# Patient Record
Sex: MALE | Race: WHITE | Employment: UNEMPLOYED | URBAN - METROPOLITAN AREA
[De-identification: names, ages, dates, MRNs, and addresses within clinical notes are randomized per-mention and may not be internally consistent; named-entity substitution may affect disease eponyms.]

---

## 2019-05-30 ENCOUNTER — APPOINTMENT (OUTPATIENT)
Dept: CT IMAGING | Age: 84
End: 2019-05-30
Payer: MEDICARE

## 2019-05-30 ENCOUNTER — HOSPITAL ENCOUNTER (EMERGENCY)
Age: 84
Discharge: NURSING, REHAB OR LONG-TERM CARE | End: 2019-05-30
Attending: EMERGENCY MEDICINE
Payer: MEDICARE

## 2019-05-30 ENCOUNTER — APPOINTMENT (OUTPATIENT)
Dept: GENERAL RADIOLOGY | Age: 84
End: 2019-05-30
Payer: MEDICARE

## 2019-05-30 VITALS
DIASTOLIC BLOOD PRESSURE: 101 MMHG | SYSTOLIC BLOOD PRESSURE: 152 MMHG | TEMPERATURE: 98.5 F | RESPIRATION RATE: 18 BRPM | OXYGEN SATURATION: 100 % | HEART RATE: 88 BPM
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
## 2019-05-30 DIAGNOSIS — S01.01XA LACERATION OF SCALP, INITIAL ENCOUNTER: ICD-10-CM

## 2019-05-30 PROCEDURE — 73610 X-RAY EXAM OF ANKLE: CPT

## 2019-05-30 PROCEDURE — 2500000003 HC RX 250 WO HCPCS: Performed by: PHYSICIAN ASSISTANT

## 2019-05-30 PROCEDURE — 99285 EMERGENCY DEPT VISIT HI MDM: CPT

## 2019-05-30 PROCEDURE — 72125 CT NECK SPINE W/O DYE: CPT

## 2019-05-30 PROCEDURE — 73630 X-RAY EXAM OF FOOT: CPT

## 2019-05-30 PROCEDURE — 4500000025 HC ED LEVEL 5 PROCEDURE

## 2019-05-30 PROCEDURE — 70450 CT HEAD/BRAIN W/O DYE: CPT

## 2019-05-30 RX ORDER — LIDOCAINE HYDROCHLORIDE AND EPINEPHRINE 10; 10 MG/ML; UG/ML
20 INJECTION, SOLUTION INFILTRATION; PERINEURAL ONCE
Status: COMPLETED | OUTPATIENT
Start: 2019-05-30 | End: 2019-05-30

## 2019-05-30 RX ADMIN — LIDOCAINE HYDROCHLORIDE,EPINEPHRINE BITARTRATE 20 ML: 10; .01 INJECTION, SOLUTION INFILTRATION; PERINEURAL at 23:00

## 2019-05-30 ASSESSMENT — PAIN DESCRIPTION - LOCATION: LOCATION: HEAD;FOOT

## 2019-05-30 ASSESSMENT — PAIN SCALES - GENERAL
PAINLEVEL_OUTOF10: 0
PAINLEVEL_OUTOF10: 4

## 2019-05-30 ASSESSMENT — PAIN DESCRIPTION - ORIENTATION: ORIENTATION: RIGHT

## 2019-05-30 ASSESSMENT — PAIN DESCRIPTION - DESCRIPTORS: DESCRIPTORS: ACHING

## 2019-05-30 ASSESSMENT — PAIN DESCRIPTION - FREQUENCY: FREQUENCY: CONTINUOUS

## 2019-05-31 NOTE — ED NOTES
Report called to [facility]     [facility]  05/30/19 3527

## 2019-05-31 NOTE — ED NOTES
[facility] here to transport pt back to nursing home all discharge papers sent with them     Rudi Aguilar RN  05/30/19 2130

## 2019-05-31 NOTE — ED PROVIDER NOTES
ED Attending Attestation Note     Date of evaluation: 5/30/2019    This patient was seen by the advance practice provider. I have seen and examined the patient, agree with the workup, evaluation, management and diagnosis. The care plan has been discussed. My assessment reveals a 78-year-old male chronically ill appearing, nonverbal with apparently nonverbal at baseline who presents today from chemical fall. Has small lacerations of posterior head but otherwise no complaints moving all extremities spontaneously. History is significantly limited given baseline mental status. Will obtain head CT, cervical CT. I was present for laceration repair performed by YULISA.        Domonique Barnes MD  05/30/19 5073

## 2019-05-31 NOTE — ED NOTES
Bed: B22-22  Expected date:   Expected time:   Means of arrival:   Comments:  Hector Redding, RN  05/30/19 2037

## 2019-05-31 NOTE — ED PROVIDER NOTES
[facility]  EMERGENCY DEPARTMENT ENCOUNTER          PHYSICIAN ASSISTANT NOTE       Date of evaluation: 5/30/2019    Chief Complaint     Laceration      History of Present Illness     Jordan Garibay is a 80 y.o. male who presents with complaint of fall and head injury. Per EMS the patient is at his mental baseline and lives at a nursing home. He refuses to wear gripping socks and falls frequently. Patient is unable to provide circumstances around the fall. He admits to pain in his right foot, but is unable to describe the pain. He has a laceration to the right posterior scalp. In chart most recent tdap 2013    Review of Systems     Review of Systems   Unable to perform ROS: Dementia       Past Medical, Surgical, Family, and Social History     He has a past medical history of Hypothyroid, Restless leg syndrome, T.I.A., and TIA (transient ischemic attack). He has a past surgical history that includes Elbow surgery. His family history is not on file. He reports that he has never smoked. He has never used smokeless tobacco. He reports that he drank alcohol. He reports that he does not use drugs. Medications     Previous Medications    DIPYRIDAMOLE-ASPIRIN (AGGRENOX)  MG PER SR CAPSULE    TAKE ONE CAPSULE BY MOUTH TWICE A DAY    FOLIC ACID (FOLVITE) 1 MG TABLET        GABAPENTIN (NEURONTIN) 100 MG CAPSULE    Take 1 capsule by mouth 3 times daily    KETOCONAZOLE (NIZORAL) 2 % CREAM        LEVOTHYROXINE (SYNTHROID) 75 MCG TABLET    TAKE ONE TABLET BY MOUTH DAILY    TAMSULOSIN (FLOMAX) 0.4 MG CAPSULE    TAKE ONE CAPSULE BY MOUTH DAILY       Allergies     He is allergic to methotrexate derivatives. Physical Exam     INITIAL VITALS: BP: (!) 152/101, Temp: 98.5 °F (36.9 °C), Pulse: 88, Resp: 18, SpO2: 100 %  Physical Exam   Constitutional: He is oriented to person, place, and time. He appears well-developed and well-nourished. No distress. HENT:   Head: Normocephalic.  Head is with contusion and with laceration (2 cm on the right posterior scalp ). Head is without raccoon's eyes, without Bass's sign and without abrasion. Eyes: Conjunctivae are normal.   Neck: Normal range of motion. Cardiovascular: Normal rate, regular rhythm and normal heart sounds. Pulmonary/Chest: Effort normal and breath sounds normal. No respiratory distress. Abdominal: Soft. There is no tenderness. Musculoskeletal: Normal range of motion. He exhibits no edema. Right ankle: Tenderness (\"heather sore\" per patient without focal tenderness). Cervical back: He exhibits no tenderness and no bony tenderness. Thoracic back: He exhibits no tenderness and no bony tenderness. Lumbar back: He exhibits no tenderness and no bony tenderness. Right foot: There is tenderness. There is normal range of motion, no bony tenderness, no swelling, no deformity and no laceration. Negative log roll bilaterally. Neurological: He is alert and oriented to person, place, and time. Skin: Skin is warm and dry. He is not diaphoretic. Psychiatric: He has a normal mood and affect. His behavior is normal. Judgment and thought content normal.   Nursing note and vitals reviewed. Diagnostic Results         RADIOLOGY:  CT Head WO Contrast   Final Result   Impression:   1. Scalp hematoma posteriorly on the right, near the vertex. 2. No acute intracranial hemorrhage or mass effect. 3. Irregularity identified in the base of the dens, most likely related to hypertrophic changes opposed to an acute fracture. Otherwise no evidence of an acute abnormality the cervical spine. 4. Multilevel spondylosis in the cervical spine contributing to severe foraminal narrowing at C4-C5 and C5-C6 on the right. 5. 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       CT CERVICAL SPINE WO CONTRAST Final Result   Impression:   1. Scalp hematoma posteriorly on the right, near the vertex. 2. No acute intracranial hemorrhage or mass effect. 3. Irregularity identified in the base of the dens, most likely related to hypertrophic changes opposed to an acute fracture. Otherwise no evidence of an acute abnormality the cervical spine. 4. Multilevel spondylosis in the cervical spine contributing to severe foraminal narrowing at C4-C5 and C5-C6 on the right. 5. 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 XR ANKLE RIGHT (MIN 3 VIEWS)   Final Result      Soft tissue swelling in the ankle. No acute fracture in the ankle or foot. XR FOOT RIGHT (MIN 3 VIEWS)   Final Result      Soft tissue swelling in the ankle. No acute fracture in the ankle or foot. LABS:   No results found for this visit on 05/30/19. ED BEDSIDE ULTRASOUND:      RECENT VITALS:  BP: (!) 152/101, Temp: 98.5 °F (36.9 °C), Pulse: 88, Resp: 18, SpO2: 100 %     Procedures     Laceration Repair Procedure Note    Indication: Laceration    Procedure:  After verbal informed consent was obtained, the patient was placed in the appropriate position and anesthesia around the laceration was obtained by infiltration using 1% Lidocaine with epinephrine. The area was then irrigated with high pressure normal saline. The laceration was closed with staples. There were no additional lacerations requiring repair. The wound area was then dressed with a sterile dressing. Total repaired wound length: 2 cm. Other Items: Staple count: 2    The patient tolerated the procedure well. Complications: None      ED Course     Nursing Notes, Past Medical Hx,Past Surgical Hx, Social Hx, Allergies, and Family Hx were reviewed.     The patient was given the following medications:  Orders Placed This Encounter Medications    lidocaine-EPINEPHrine 1 percent-1:[id number] injection 20 mL       CONSULTS:  None    MEDICAL DECISION MAKING / ASSESSMENT / PLAN     Jennifer Westbrook is a 80 y.o. male presented to the emergency department after a fall and has a laceration. Patient has history of dementia and is unable to provide history. Reviewing his chart he does have a history of frequent falls. CT of the head and neck were obtained which were negative for acute abnormalities. Patient also reported that his foot hurt although he had no focal tenderness, x-ray of the foot and ankle were negative. Patient had laceration repair, please see procedure note above for details. He'll be given instructions on wound care and can follow up with his primary care provider for staple removal.  Patient will be discharged back to his nursing facility in stable condition. This patient was also evaluated by the attending physician. All care plans were discussed and agreed upon. Clinical Impression     1. Fall, initial encounter    2.  Laceration of scalp, initial encounter        Disposition     PATIENT REFERRED TO:  Geri Villaseñor [address]  [phone]            DISCHARGE MEDICATIONS:  New Prescriptions    No medications on file       DISPOSITION Decision To Discharge 05/30/2019 10:57:38 PM       Radha Mary Tanika Howell  05/30/19 5953